# Patient Record
Sex: MALE | Race: WHITE
[De-identification: names, ages, dates, MRNs, and addresses within clinical notes are randomized per-mention and may not be internally consistent; named-entity substitution may affect disease eponyms.]

---

## 2019-06-10 ENCOUNTER — HOSPITAL ENCOUNTER (EMERGENCY)
Dept: HOSPITAL 25 - ED | Age: 39
LOS: 1 days | Discharge: HOME | End: 2019-06-11
Payer: COMMERCIAL

## 2019-06-10 DIAGNOSIS — Z88.1: ICD-10-CM

## 2019-06-10 DIAGNOSIS — F17.200: ICD-10-CM

## 2019-06-10 DIAGNOSIS — K04.7: Primary | ICD-10-CM

## 2019-06-10 PROCEDURE — 96372 THER/PROPH/DIAG INJ SC/IM: CPT

## 2019-06-10 PROCEDURE — 99282 EMERGENCY DEPT VISIT SF MDM: CPT

## 2019-06-11 VITALS — DIASTOLIC BLOOD PRESSURE: 118 MMHG | SYSTOLIC BLOOD PRESSURE: 164 MMHG

## 2019-06-11 NOTE — ED
Throat Pain/Nasal Congestion





- HPI Summary


HPI Summary: 


39 year old male presents with dental pain since yesterday.  he states that 

pain is in his right upper jaw and radiates to his ear.  Denies any chest pain 

or shortness breath.  No pain or swelling around eye.  No nausea and no 

vomiting.  He states the cold makes the pain better.  Has a history of bad 

teeth.  





- History of Current Complaint


Chief Complaint: EDDentalPain


Time Seen by Provider: 06/11/19 01:21





- Allergies/Home Medications


Allergies/Adverse Reactions: 


 Allergies











Allergy/AdvReac Type Severity Reaction Status Date / Time


 


MS Levothyroxine Allergy Intermediate Swelling Verified 06/11/19 01:23





[Levothyroxine]   Of  





   Face,Lips,&  





   Throat  














PMH/Surg Hx/FS Hx/Imm Hx


Endocrine/Hematology History: 


   Denies: Hx Anticoagulant Therapy


Respiratory History: 


   Denies: Hx Asthma





- Immunization History


Date of Tetanus Vaccine: PT STATES UNSURE


Date of Influenza Vaccine: NONE


Infectious Disease History: No


Infectious Disease History: 


   Denies: Traveled Outside the [place] in Last 30 Days





- Family History


Known Family History: Positive: Non-Contributory





- Social History


Alcohol Use: Occasionally


Substance Use Type: Reports: None


Smoking Status (MU): Heavy Every Day Tobacco Smoker





Review of Systems


Negative: Fever


Positive: Dental Pain


Negative: Chest Pain


Negative: Shortness Of Breath


All Other Systems Reviewed And Are Negative: Yes





Physical Exam


Triage Information Reviewed: Yes


Vital Signs On Initial Exam: 


 Initial Vitals











Temp Pulse Resp BP Pulse Ox


 


 98.2 F   75   16   164/118   98 


 


 06/10/19 23:56  06/10/19 23:56  06/10/19 23:56  06/10/19 23:56  06/10/19 23:56











Vital Signs Reviewed: Yes


Appearance: Positive: Well-Appearing


Skin: Positive: Warm, Dry


Head/Face: Positive: Normal Head/Face Inspection


Eyes: Positive: Normal, EOMI, DIOGENES, Conjunctiva Clear


ENT: Positive: Pharynx normal, TMs normal


Dental: Positive: Gross Decay/Caries @ - throughout, Other - erythema to right 

upper jaw


Respiratory/Lung Sounds: Positive: Clear to Auscultation, Breath Sounds Present


Cardiovascular: Positive: Normal, RRR


Musculoskeletal: Positive: Normal


Neurological: Positive: Normal


Psychiatric: Positive: Normal





Diagnostics





- Vital Signs


 Vital Signs











  Temp Pulse Resp BP Pulse Ox


 


 06/10/19 23:56  98.2 F  75  16  164/118  98














- Laboratory


Lab Statement: Any lab studies that have been ordered have been reviewed, and 

results considered in the medical decision making process.





EENT Course/Dx





- Course


Course Of Treatment: 39 year old male presents with dental pain since 

yesterday.  he states that pain is in his right upper jaw and radiates to his 

ear.  Denies any chest pain or shortness breath.  No pain or swelling around 

eye.  No nausea and no vomiting.  He states the cold makes the pain better.  

Has a history of bad teeth.  On exam no swelling noted.  TM normal.  Has 

erythema noted to right upper gumline.  We will treat with penicillin.  gave 

dose of toradol for pain.  Told to follow up with dentist.  Patient understands 

and agrees plan.





- Differential Diagnoses


Differential Diagnoses: Dental Abscess, Dental Caries, Fractured Tooth





- Diagnoses


Provider Diagnoses: 


 Dental infection








Discharge





- Sign-Out/Discharge


Documenting (check all that apply): Patient Departure


Patient Received Moderate/Deep Sedation with Procedure: No





- Discharge Plan


Condition: Good


Disposition: HOME


Prescriptions: 


Penicillin VK TAB* [Penicillin  mg Tab*] 500 mg PO QID #27 tab


Patient Education Materials:  Toothache (ED)


Referrals: 


No Primary Care Phys,NOPCP [Primary Care Provider] - 


Additional Instructions: 


Take antibiotics: 4 times a day for 7 days, first dose given in ED


Use ibuprofen or tyenlol every 6 hours


Return to ED if develop fever, shortness of breath, pain with eye movement or 

swelling around eye


Establish care with primary care physician 


follow up with dentist as soon as possible





- Billing Disposition and Condition


Condition: GOOD


Disposition: Home

## 2019-10-02 ENCOUNTER — HOSPITAL ENCOUNTER (EMERGENCY)
Dept: HOSPITAL 25 - ED | Age: 39
Discharge: HOME | End: 2019-10-02
Payer: COMMERCIAL

## 2019-10-02 VITALS — DIASTOLIC BLOOD PRESSURE: 79 MMHG | SYSTOLIC BLOOD PRESSURE: 127 MMHG

## 2019-10-02 DIAGNOSIS — Z88.8: ICD-10-CM

## 2019-10-02 DIAGNOSIS — F17.200: ICD-10-CM

## 2019-10-02 DIAGNOSIS — F14.10: Primary | ICD-10-CM

## 2019-10-02 LAB
ALBUMIN SERPL BCG-MCNC: 4.2 G/DL (ref 3.2–5.2)
ALBUMIN/GLOB SERPL: 1.8 {RATIO} (ref 1–3)
ALP SERPL-CCNC: 48 U/L (ref 34–104)
ALT SERPL W P-5'-P-CCNC: 14 U/L (ref 7–52)
ANION GAP SERPL CALC-SCNC: 4 MMOL/L (ref 2–11)
AST SERPL-CCNC: 14 U/L (ref 13–39)
BASOPHILS # BLD AUTO: 0 10^3/UL (ref 0–0.2)
BUN SERPL-MCNC: 17 MG/DL (ref 6–24)
BUN/CREAT SERPL: 19.1 (ref 8–20)
CALCIUM SERPL-MCNC: 8.9 MG/DL (ref 8.6–10.3)
CHLORIDE SERPL-SCNC: 108 MMOL/L (ref 101–111)
EOSINOPHIL # BLD AUTO: 0 10^3/UL (ref 0–0.6)
GLOBULIN SER CALC-MCNC: 2.4 G/DL (ref 2–4)
GLUCOSE SERPL-MCNC: 109 MG/DL (ref 70–100)
HCO3 SERPL-SCNC: 25 MMOL/L (ref 22–32)
HCT VFR BLD AUTO: 41 % (ref 42–52)
HGB BLD-MCNC: 14.3 G/DL (ref 14–18)
LYMPHOCYTES # BLD AUTO: 0.8 10^3/UL (ref 1–4.8)
MCH RBC QN AUTO: 32 PG (ref 27–31)
MCHC RBC AUTO-ENTMCNC: 35 G/DL (ref 31–36)
MCV RBC AUTO: 93 FL (ref 80–94)
MONOCYTES # BLD AUTO: 0.7 10^3/UL (ref 0–0.8)
NEUTROPHILS # BLD AUTO: 11.2 10^3/UL (ref 1.5–7.7)
NRBC # BLD AUTO: 0 10^3/UL
NRBC BLD QL AUTO: 0
PLATELET # BLD AUTO: 214 10^3/UL (ref 150–450)
POTASSIUM SERPL-SCNC: 3.9 MMOL/L (ref 3.5–5)
PROT SERPL-MCNC: 6.6 G/DL (ref 6.4–8.9)
RBC # BLD AUTO: 4.42 10^6 /UL (ref 4.18–5.48)
SODIUM SERPL-SCNC: 137 MMOL/L (ref 135–145)
TROPONIN I SERPL-MCNC: 0.01 NG/ML (ref ?–0.04)
WBC # BLD AUTO: 12.8 10^3/UL (ref 3.5–10.8)

## 2019-10-02 PROCEDURE — 86140 C-REACTIVE PROTEIN: CPT

## 2019-10-02 PROCEDURE — 80053 COMPREHEN METABOLIC PANEL: CPT

## 2019-10-02 PROCEDURE — 36415 COLL VENOUS BLD VENIPUNCTURE: CPT

## 2019-10-02 PROCEDURE — 85025 COMPLETE CBC W/AUTO DIFF WBC: CPT

## 2019-10-02 PROCEDURE — 99283 EMERGENCY DEPT VISIT LOW MDM: CPT

## 2019-10-02 PROCEDURE — 84484 ASSAY OF TROPONIN QUANT: CPT

## 2019-10-02 PROCEDURE — 93005 ELECTROCARDIOGRAM TRACING: CPT

## 2019-10-02 NOTE — XMS REPORT
[facility]

 Created on:2019



Patient:Antolin Hart

Sex:Male

:1980

External Reference #:[id number]





Demographics







 Address  [address] 



   [place] [address]

 

 Phone  [phone]

 

 Email Address  [email or web address]

 

 Preferred Language  English

 

 Marital Status  Not  or 

 

 Baptism Affiliation  Unknown

 

 Race  White

 

 Ethnic Group  Not  or 









Author







 Organization  [facility]









Support







 Name  Relationship  Address  Phone

 

 Antolin Hart  Unavailable  PO   [phone]



     [place] [address]  

 

 Angel Hart  Unavailable  Unavailable  [phone]









Care Team Providers







 Name  Role  Phone

 

 Gil Mane  Unavailable  Unavailable









PROBLEMS

Unknown Problems



ALLERGIES

No Information



ENCOUNTERS







 Encounter  Location  Date  Diagnosis

 

 [place] [facility]  [address]  [place],    



   [place] [address]    

 

 [facility]  [address]  [place],    



   [place] [address]    

 

 [facility]  [address]  [place],  29 Aug, 2019  



   [place] [address]    

 

 [facility]  [address]  [place],  20 Aug, 2019  



   [place] [address]    

 

 [facility]  [address]  [place],  06 May, 2019  



   [place] [address]    

 

 [facility]  [address]  [place],  06 May, 2019  



   [place] [address]    

 

 [facility]  [address]  [place],    Flu-like 
symptoms R68.89



   [place] [address]    and Tick bite, initial



       encounter W57.XXXA

 

 [place] [facility]  [address]  [place],  02 Aug, 2016  



   [place] [address]    

 

 [facility]  [address]  [place],    Dental abscess 
[address]



   [place] [address]    

 

 [place] [facility]  [address]  [place],    



   [place] [address]    







IMMUNIZATIONS

No Known Immunizations



SOCIAL HISTORY

Never Assessed



REASON FOR REFERRAL





FUNCTIONAL STATUS





PLAN OF CARE





VITAL SIGNS







 Height  66.6 in  2019

 

 Blood pressure systolic  139 mm Hg  2019

 

 Blood pressure diastolic  89 mm Hg  2019







MEDICATIONS







 Medication  Instructions  Dosage  Frequency  Start Date  End Date  Duration  
Status

 

 Tamiflu 75 MG  Orally Twice a  1 capsule  12h  25 Apr,    5 day(s)  Active



   day      2019      







PROCEDURES







 Procedure  Date Ordered  Result  Body Site

 

 BLOOD PRESSURE, MEASURED  Aug 29, 2019    

 

 EXTRAC ERUPTED TOOTH/EXPOSED ROOT  Aug 29, 2019    







RESULTS

No Results



REASON FOR VISIT





Insurance Providers







 [facility]  Health  Member  Patient  Patient  Patient  
Patient  Patient  Subscriber  Subscriber  Subscriber  Group



 Insurance  Plan  Plan  Plan  Plan  ID  Relationship  Address  Phone  Name  
Date of  ID  Name  Date of  No



 Type  Insurance  Insurance  Insurance  Coverage    to Subscriber        Birth 
     Birth  



   Address  Phone  Name  Dates                    

 

 Medicaid  [address]  [phone]  Medicaid      self      [place]  [id number]  
[id number]      



 [facility]  [address]            [place]          



   [address]                          

 

 [place]  [phone]  [place]      self      [place]  [id number]  
[phone]      



 Medicaid  [address]  [place] [place] [address]    [place]                      

 

 [place]  [address]  [phone]  [place]      self      [place]  [id number]  
[id number]      



 Medicaid [place]  [address]  Med[address] [facility] [place] [address]    Medical                      







MEDICAL (GENERAL) HISTORY







 Type  Description  Date

 

 Medical History  back pain  

 

 Medical History  thyroid disease
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
## 2019-10-02 NOTE — XMS REPORT
[facility]

 Created on:2019



Patient:Antolin Hart

Sex:Male

:1980

External Reference #:[id number]





Demographics







 Address  [address] 



   [place] [address]

 

 Phone  [phone]

 

 Email Address  [email or web address]

 

 Preferred Language  English

 

 Marital Status  Not  or 

 

 Mormonism Affiliation  Unknown

 

 Race  White

 

 Ethnic Group  Not  or 









Author







 Organization  [facility]









Support







 Name  Relationship  Address  Phone

 

 Antolin Hart  Unavailable  PO   [phone]



     [place] [address]  

 

 Angel Hart  Unavailable  Unavailable  [phone]









Care Team Providers







 Name  Role  Phone

 

 Gil Mane  Unavailable  Unavailable









PROBLEMS

Unknown Problems



ALLERGIES

No Information



ENCOUNTERS







 Encounter  Location  Date  Diagnosis

 

 [facility]  [address] [place]  [place],  29 Aug, 2019  



   [place] [address]    

 

 [facility]  [address] [place]  [place],  20 Aug, 2019  



   [place] [address]    

 

 [facility]  [address] [place]  [place],  06 May, 2019  



   [place] [address]    

 

 [facility]  [address] [place]  [place],  06 May, 2019  



   [place] [address]    

 

 [facility]  [address] [place]  [place],    Flu-like 
symptoms R68.89



   [place] [address]    and Tick bite, initial



       encounter W57.XXXA

 

 [facility]  [address] [place]  [place],  02 Aug, 2016  



   [place] [address]    

 

 [facility]  [address] [place]  [place],    Dental abscess 
[address]



   [place] [address]    

 

 [facility]  [address] [place]  [place],    



   [place] [address]    







IMMUNIZATIONS

No Known Immunizations



SOCIAL HISTORY

Never Assessed



REASON FOR REFERRAL





FUNCTIONAL STATUS





PLAN OF CARE





VITAL SIGNS





MEDICATIONS







 Medication  Instructions  Dosage  Frequency  Start Date  End Date  Duration  
Status

 

 Tamiflu 75 MG  Orally Twice a  1 capsule  12h  25 Apr,    5 day(s)  Active



   day      2019      







PROCEDURES







 Procedure  Date Ordered  Result  Body Site

 

 INTRAORL-PERIAPICAL 1 FILM 48817  Aug 20, 2019    

 

 LTD ORAL EVALUATION-PROBLEM FOCUS  Aug 20, 2019    







RESULTS

No Results



REASON FOR VISIT

walk in



Insurance Providers







 [facility]  Health  Member  Patient  Patient  Patient  
Patient  Patient  Subscriber  Subscriber  Subscriber  Group



 Insurance  Plan  Plan  Plan  Plan  ID  Relationship  Address  Phone  Name  
Date of  ID  Name  Date of  No



 Type  Insurance  Insurance  Insurance  Coverage    to Subscriber        Birth 
     Birth  



   Address  Phone  Name  Dates                    

 

 [place]  [phone]  [place]      self      [place]  [id number]  
[id number]      



 Medicaid  [address]  Medicaid            [facility]    [place]                      



 [facility] [address]    [employer]  [address]  [phone]  Medicaid      self      [place]  [id number]  
[id number]      



 [place]  [facility]  [address]            [place]          



   [id number]                          

 

 [place]  [address]  [phone]  [place]      self      [place]  [id number]  
[id number]      



 Medicaid   [place]  [address]  [place] [facility] [place] [address]    Medical                      







MEDICAL (GENERAL) HISTORY







 Type  Description  Date

 

 Medical History  back pain  

 

 Medical History  thyroid disease

## 2019-10-19 ENCOUNTER — HOSPITAL ENCOUNTER (EMERGENCY)
Dept: HOSPITAL 25 - ED | Age: 39
Discharge: HOME | End: 2019-10-19
Payer: COMMERCIAL

## 2019-10-19 VITALS — DIASTOLIC BLOOD PRESSURE: 82 MMHG | SYSTOLIC BLOOD PRESSURE: 130 MMHG

## 2019-10-19 DIAGNOSIS — R42: ICD-10-CM

## 2019-10-19 DIAGNOSIS — F17.200: ICD-10-CM

## 2019-10-19 DIAGNOSIS — T63.441A: Primary | ICD-10-CM

## 2019-10-19 DIAGNOSIS — J02.9: ICD-10-CM
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
## 2019-10-19 DIAGNOSIS — Z88.8: ICD-10-CM

## 2019-10-19 DIAGNOSIS — R11.0: ICD-10-CM

## 2019-10-19 LAB
ALBUMIN SERPL BCG-MCNC: 4.8 G/DL (ref 3.2–5.2)
ALBUMIN/GLOB SERPL: 1.7 {RATIO} (ref 1–3)
ALP SERPL-CCNC: 54 U/L (ref 34–104)
ALT SERPL W P-5'-P-CCNC: 16 U/L (ref 7–52)
ANION GAP SERPL CALC-SCNC: 18 MMOL/L (ref 2–11)
AST SERPL-CCNC: 21 U/L (ref 13–39)
BASOPHILS # BLD AUTO: 0.1 10^3/UL (ref 0–0.2)
BUN SERPL-MCNC: 14 MG/DL (ref 6–24)
BUN/CREAT SERPL: 10.5 (ref 8–20)
CALCIUM SERPL-MCNC: 9.6 MG/DL (ref 8.6–10.3)
CHLORIDE SERPL-SCNC: 105 MMOL/L (ref 101–111)
EOSINOPHIL # BLD AUTO: 0 10^3/UL (ref 0–0.6)
GLOBULIN SER CALC-MCNC: 2.9 G/DL (ref 2–4)
GLUCOSE SERPL-MCNC: 85 MG/DL (ref 70–100)
HCO3 SERPL-SCNC: 16 MMOL/L (ref 22–32)
HCT VFR BLD AUTO: 43 % (ref 42–52)
HGB BLD-MCNC: 14.6 G/DL (ref 14–18)
LYMPHOCYTES # BLD AUTO: 0.9 10^3/UL (ref 1–4.8)
MCH RBC QN AUTO: 32 PG (ref 27–31)
MCHC RBC AUTO-ENTMCNC: 34 G/DL (ref 31–36)
MCV RBC AUTO: 95 FL (ref 80–94)
MONOCYTES # BLD AUTO: 1 10^3/UL (ref 0–0.8)
NEUTROPHILS # BLD AUTO: 12.3 10^3/UL (ref 1.5–7.7)
NRBC # BLD AUTO: 0 10^3/UL
NRBC BLD QL AUTO: 0.1
PLATELET # BLD AUTO: 230 10^3/UL (ref 150–450)
POTASSIUM SERPL-SCNC: 3.6 MMOL/L (ref 3.5–5)
PROT SERPL-MCNC: 7.7 G/DL (ref 6.4–8.9)
RBC # BLD AUTO: 4.54 10^6 /UL (ref 4.18–5.48)
SODIUM SERPL-SCNC: 139 MMOL/L (ref 135–145)
TROPONIN I SERPL-MCNC: 0.02 NG/ML (ref ?–0.04)
WBC # BLD AUTO: 14.3 10^3/UL (ref 3.5–10.8)

## 2019-10-19 PROCEDURE — 80053 COMPREHEN METABOLIC PANEL: CPT

## 2019-10-19 PROCEDURE — 99282 EMERGENCY DEPT VISIT SF MDM: CPT

## 2019-10-19 PROCEDURE — 84484 ASSAY OF TROPONIN QUANT: CPT

## 2019-10-19 PROCEDURE — 85025 COMPLETE CBC W/AUTO DIFF WBC: CPT

## 2019-10-19 PROCEDURE — 36415 COLL VENOUS BLD VENIPUNCTURE: CPT

## 2019-10-19 PROCEDURE — 93005 ELECTROCARDIOGRAM TRACING: CPT

## 2019-10-19 PROCEDURE — 86140 C-REACTIVE PROTEIN: CPT

## 2019-10-19 NOTE — ED
Complex/Multi-Sys Presentation





- HPI Summary


HPI Summary: 


39-year-old male presents with allergic reaction.  He states he got stung in 

his left arm about an hour ago.  He states that since then he's had a sore 

throat and dizziness.  He states he feels a little dehydrated.  Denies any 

chest pressure or shortness of breath.  Admits to nausea but no vomiting or 

abdominal pain.  He hasn't taking anything for symptoms.  Never had this 

reaction before.  He did drink some alcohol today.  Denies any drug use.  No 

rash.  





- History Of Current Complaint


Chief Complaint: EDAllergicReaction


Time Seen by Provider: 10/19/19 21:46





- Allergies/Home Medications


Allergies/Adverse Reactions: 


 Allergies











Allergy/AdvReac Type Severity Reaction Status Date / Time


 


levothyroxine Allergy  swelling Verified 10/19/19 22:13





   of face,  





   lips, and  





   throat  














PMH/Surg Hx/FS Hx/Imm Hx


Endocrine/Hematology History: 


   Denies: Hx Anticoagulant Therapy


Respiratory History: 


   Denies: Hx Asthma


 History: 


   Denies: Hx Dialysis


Sensory History: 


   Denies: Hx Eye Prosthesis, Hx Legally Blind, Hx Deafness


Opthamlomology History: 


   Denies: Hx Eye Prosthesis, Hx Legally Blind


Neurological History: 


   Denies: Hx Dementia


Psychiatric History: 


   Denies: Hx Autism





- Immunization History


Date of Tetanus Vaccine: PT STATES UNSURE


Date of Influenza Vaccine: NONE


Infectious Disease History: No


Infectious Disease History: 


   Denies: Traveled Outside the [place] in Last 30 Days





- Family History


Known Family History: Positive: Non-Contributory





- Social History


Alcohol Use: Rare


Substance Use Type: Reports: Cocaine, Excessive Caffeine


Smoking Status (MU): Heavy Every Day Tobacco Smoker





Review of Systems


Negative: Fever


Positive: Sore Throat


Negative: Chest Pain


Negative: Shortness Of Breath


Negative: Rash


All Other Systems Reviewed And Are Negative: Yes





Physical Exam


Triage Information Reviewed: Yes


Vital Signs On Initial Exam: 


 Initial Vitals











Temp Pulse Resp BP Pulse Ox


 


 98.8 F   115   20   146/92   96 


 


 10/19/19 21:29  10/19/19 21:29  10/19/19 21:29  10/19/19 21:29  10/19/19 21:29











Vital Signs Reviewed: Yes


Appearance: Positive: Well-Appearing


Skin: Positive: Warm, Dry


Head/Face: Positive: Normal Head/Face Inspection


Eyes: Positive: Normal, EOMI, DIOGENES, Conjunctiva Clear


ENT: Positive: Normal ENT inspection, Pharynx normal, TMs normal


Respiratory/Lung Sounds: Positive: Clear to Auscultation, Breath Sounds Present


Cardiovascular: Positive: Normal, RRR


Abdomen Description: Positive: Nontender, Soft


Bowel Sounds: Positive: Present


Musculoskeletal: Positive: Normal


Neurological: Positive: Normal


Psychiatric: Positive: Normal





Procedures





- Sedation


Patient Received Moderate/Deep Sedation with Procedure: No





Diagnostics





- Vital Signs


 Vital Signs











  Temp Pulse Resp BP Pulse Ox


 


 10/19/19 22:11   95   122/78  95


 


 10/19/19 21:29  98.8 F  115  20  146/92  96














- Laboratory


Lab Results: 


 Lab Results











  10/19/19 Range/Units





  22:08 


 


WBC  14.3 H  (3.5-10.8)  10^3/uL


 


RBC  4.54  (4.18-5.48)  10^6 /uL


 


Hgb  14.6  (14.0-18.0)  g/dL


 


Hct  43  (42-52)  %


 


MCV  95 H  (80-94)  fL


 


MCH  32 H  (27-31)  pg


 


MCHC  34  (31-36)  g/dL


 


RDW  14  (10-15)  %


 


Plt Count  230  (150-450)  10^3/uL


 


MPV  6.8 L  (7.4-10.4)  fL


 


Neut % (Auto)  85.7  %


 


Lymph % (Auto)  6.6  %


 


Mono % (Auto)  7.2  %


 


Eos % (Auto)  0.1  %


 


Baso % (Auto)  0.4  %


 


Absolute Neuts (auto)  12.3 H  (1.5-7.7)  10^3/ul


 


Absolute Lymphs (auto)  0.9 L  (1.0-4.8)  10^3/ul


 


Absolute Monos (auto)  1.0 H  (0-0.8)  10^3/ul


 


Absolute Eos (auto)  0.0  (0-0.6)  10^3/ul


 


Absolute Basos (auto)  0.1  (0-0.2)  10^3/ul


 


Absolute Nucleated RBC  0.0  10^3/ul


 


Nucleated RBC %  0.1  











Result Diagrams: 


 10/19/19 22:08





 10/19/19 22:08


Lab Statement: Any lab studies that have been ordered have been reviewed, and 

results considered in the medical decision making process.





- EKG


  ** No standard instances


Cardiac Rate: NL


EKG Rhythm: Sinus Rhythm


EKG Comparison: No Significant Change


Summary of EKG Findings: sinus rhythm





Re-Evaluation





- Re-Evaluation


  ** First Eval


Re-Evaluation Time: 22:44


Change: Improved


Comment: feeling better





Complex Multi-Symp Course/Dx


Course Of Treatment: 39-year-old male presents with allergic reaction.  He 

states he got stung in his left arm about an hour ago.  He states that since 

then he's had a sore throat and dizziness.  He states he feels a little 

dehydrated.  Denies any chest pressure or shortness of breath.  Admits to 

nausea but no vomiting or abdominal pain.  He hasn't taking anything for 

symptoms.  Never had this reaction before.  He did drink some alcohol today.  

Denies any drug use.  No rash.  On exam no rash.  lungs CTA.  Pharynx normal. 

wbc elevated. crp normal. troponin .02. ekg sinus rhythm.  Gave fluids and 

Benadryl patient is feeling better.  We'll discharge to have continued 

Benadryl.  Patient understands agrees the plan.





- Diagnoses


Differential Diagnoses/HQI/PQRI: Metabolic Abnormality, Other - allergic 

reaction, anaphylaxis


Provider Diagnoses: 


 Bee sting








Discharge ED





- Sign-Out/Discharge


Documenting (check all that apply): Patient Departure





- Discharge Plan


Condition: Good


Disposition: HOME


Patient Education Materials:  Insect Bite or Sting (ED)


Referrals: 


No Primary Care Phys,NOPCP [Primary Care Provider] - 


Additional Instructions: 


Take Benadryl every 6 hours


follow up with primary


Return to ED if develop any new or worsening symptoms





- Billing Disposition and Condition


Condition: GOOD


Disposition: Home

## 2019-11-05 ENCOUNTER — HOSPITAL ENCOUNTER (EMERGENCY)
Dept: HOSPITAL 25 - ED | Age: 39
Discharge: HOME | End: 2019-11-05
Payer: COMMERCIAL

## 2019-11-05 VITALS — SYSTOLIC BLOOD PRESSURE: 110 MMHG | DIASTOLIC BLOOD PRESSURE: 62 MMHG

## 2019-11-05 DIAGNOSIS — M54.2: ICD-10-CM

## 2019-11-05 DIAGNOSIS — Z88.8: ICD-10-CM

## 2019-11-05 DIAGNOSIS — S61.411A: ICD-10-CM

## 2019-11-05 DIAGNOSIS — F10.129: ICD-10-CM

## 2019-11-05 DIAGNOSIS — F17.200: ICD-10-CM

## 2019-11-05 DIAGNOSIS — S61.412A: ICD-10-CM
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
## 2019-11-05 DIAGNOSIS — S42.031A: Primary | ICD-10-CM
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
## 2019-11-05 LAB
ALBUMIN SERPL BCG-MCNC: 4.7 G/DL (ref 3.2–5.2)
ALBUMIN/GLOB SERPL: 1.7 {RATIO} (ref 1–3)
ALP SERPL-CCNC: 73 U/L (ref 34–104)
ALT SERPL W P-5'-P-CCNC: 22 U/L (ref 7–52)
ANION GAP SERPL CALC-SCNC: 6 MMOL/L (ref 2–11)
AST SERPL-CCNC: 20 U/L (ref 13–39)
BASOPHILS # BLD AUTO: 0.1 10^3/UL (ref 0–0.2)
BUN SERPL-MCNC: 14 MG/DL (ref 6–24)
BUN/CREAT SERPL: 17.3 (ref 8–20)
CALCIUM SERPL-MCNC: 9.3 MG/DL (ref 8.6–10.3)
CHLORIDE SERPL-SCNC: 103 MMOL/L (ref 101–111)
EOSINOPHIL # BLD AUTO: 0.1 10^3/UL (ref 0–0.6)
GLOBULIN SER CALC-MCNC: 2.7 G/DL (ref 2–4)
GLUCOSE SERPL-MCNC: 107 MG/DL (ref 70–100)
HCO3 SERPL-SCNC: 28 MMOL/L (ref 22–32)
HCT VFR BLD AUTO: 43 % (ref 42–52)
HGB BLD-MCNC: 14.8 G/DL (ref 14–18)
INR PPP/BLD: 1.03 (ref 0.82–1.09)
LYMPHOCYTES # BLD AUTO: 1.3 10^3/UL (ref 1–4.8)
MCH RBC QN AUTO: 32 PG (ref 27–31)
MCHC RBC AUTO-ENTMCNC: 35 G/DL (ref 31–36)
MCV RBC AUTO: 93 FL (ref 80–94)
MONOCYTES # BLD AUTO: 0.9 10^3/UL (ref 0–0.8)
NEUTROPHILS # BLD AUTO: 13.3 10^3/UL (ref 1.5–7.7)
NRBC # BLD AUTO: 0 10^3/UL
NRBC BLD QL AUTO: 0
PLATELET # BLD AUTO: 288 10^3/UL (ref 150–450)
POTASSIUM SERPL-SCNC: 3.9 MMOL/L (ref 3.5–5)
PROT SERPL-MCNC: 7.4 G/DL (ref 6.4–8.9)
RBC # BLD AUTO: 4.57 10^6 /UL (ref 4.18–5.48)
SODIUM SERPL-SCNC: 137 MMOL/L (ref 135–145)
TROPONIN I SERPL-MCNC: 0 NG/ML (ref ?–0.04)
WBC # BLD AUTO: 15.6 10^3/UL (ref 3.5–10.8)

## 2019-11-05 PROCEDURE — G0480 DRUG TEST DEF 1-7 CLASSES: HCPCS

## 2019-11-05 PROCEDURE — 81003 URINALYSIS AUTO W/O SCOPE: CPT

## 2019-11-05 PROCEDURE — 80053 COMPREHEN METABOLIC PANEL: CPT

## 2019-11-05 PROCEDURE — 83605 ASSAY OF LACTIC ACID: CPT

## 2019-11-05 PROCEDURE — 96374 THER/PROPH/DIAG INJ IV PUSH: CPT

## 2019-11-05 PROCEDURE — 85610 PROTHROMBIN TIME: CPT

## 2019-11-05 PROCEDURE — 96361 HYDRATE IV INFUSION ADD-ON: CPT

## 2019-11-05 PROCEDURE — 70450 CT HEAD/BRAIN W/O DYE: CPT

## 2019-11-05 PROCEDURE — 99284 EMERGENCY DEPT VISIT MOD MDM: CPT

## 2019-11-05 PROCEDURE — 80320 DRUG SCREEN QUANTALCOHOLS: CPT

## 2019-11-05 PROCEDURE — 85025 COMPLETE CBC W/AUTO DIFF WBC: CPT

## 2019-11-05 PROCEDURE — 36415 COLL VENOUS BLD VENIPUNCTURE: CPT

## 2019-11-05 PROCEDURE — 93005 ELECTROCARDIOGRAM TRACING: CPT

## 2019-11-05 PROCEDURE — 80307 DRUG TEST PRSMV CHEM ANLYZR: CPT

## 2019-11-05 PROCEDURE — 84484 ASSAY OF TROPONIN QUANT: CPT

## 2019-11-05 PROCEDURE — 74177 CT ABD & PELVIS W/CONTRAST: CPT

## 2019-11-05 PROCEDURE — 72125 CT NECK SPINE W/O DYE: CPT

## 2019-11-05 PROCEDURE — 71260 CT THORAX DX C+: CPT
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
## 2020-03-25 ENCOUNTER — HOSPITAL ENCOUNTER (OUTPATIENT)
Dept: HOSPITAL 25 - OREAST | Age: 40
Discharge: HOME | End: 2020-03-25
Attending: ORTHOPAEDIC SURGERY
Payer: COMMERCIAL

## 2020-03-25 VITALS — SYSTOLIC BLOOD PRESSURE: 134 MMHG | DIASTOLIC BLOOD PRESSURE: 92 MMHG
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
## 2020-03-25 DIAGNOSIS — Z47.2: Primary | ICD-10-CM

## 2020-03-25 DIAGNOSIS — K21.9: ICD-10-CM

## 2020-03-25 DIAGNOSIS — F43.21: ICD-10-CM

## 2020-03-25 DIAGNOSIS — F17.200: ICD-10-CM

## 2020-03-25 DIAGNOSIS — S42.031D: ICD-10-CM
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
## 2020-03-25 PROCEDURE — 88300 SURGICAL PATH GROSS: CPT
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
## 2020-03-25 NOTE — OP
OPERATIVE REPORT:

 

DATE OF OPERATION:  03/25/20

 

DATE OF BIRTH:  [birth year]

 

SURGEON:  Robert Castellano MD.

 

ASSISTANT:  ANDREW Mathias.

 

A physician assistant was required for the length of the procedure for patient positioning, retractio
n, and closure.  Not all foreign body removals require an assistant.  This case does as the plate is 
placed deeper, and the clavicle just as open reduction internal fixation surgery requires an assistan
t, so too does removal of this plate require an assistant.

 

ANESTHESIOLOGIST:  Dr. Kaelyn Vieira.

 

ANESTHESIA:  General anesthesia, local anesthesia Marcaine 10 cc.

 

PRE-OP DIAGNOSES:

1.  Status post 11/20/19 open reduction internal fixation right lateral clavicle fracture with clavic
le hook plate.

2.  Retained hardware, right clavicle hook plate.

 

POST-OP DIAGNOSES:

1.  Status post 11/20/19 open reduction internal fixation right lateral clavicle fracture with clavic
le hook plate.

2.  Retained hardware, right clavicle hook plate.

 

OPERATIVE PROCEDURE:  Removal of foreign body, right lateral clavicle hook plate.

 

ANTIBIOTICS:  Ancef 2 g IV.

 

IV FLUIDS:  See Anesthesia note.

 

SKIN-TO-SKIN TIME:  38 minutes.

 

SPECIMEN:  Right Synthes clavicle hook plate with 5 screws removed.

 

COMPLICATIONS:  None.

 

ESTIMATED BLOOD LOSS:  Minimal.

 

INDICATIONS FOR PROCEDURE:  The patient is a 40-year-old man, right-hand dominant, , who under
went surgery on 11/20/19, consisting an open reduction internal fixation of right lateral clavicle fr
acture with clavicle hook plate.  Surgery was indicated due to the displacement at the lateral clavic
le fracture site.  Plate was placed with the understanding that it would need to be removed 3 to 4 mo
nths postoperative, which is the 's suggestion for this plate and the orthopedic standard
 for clavicle hook plates.  These plates need to be removed to avoid erosion of the acromion or injur
y to the rotator cuff.

 

The patient had an uneventful postoperative course.  He is just over 4 months and we signed him up fo
r removal of hardware.

 

Discussed risks and potential complications of surgery.  These included nonunion and need to replace 
the plate or need to, in the future, place a plate.  The patient still had some tenderness to palpati
on at the fracture site as well as some lucency on preoperative imaging.  He is a cigarette smoker, w
hich is certainly a risk factor for delayed union or nonunion.  However, given that we were over 4 mo
nths postoperative after the first index procedure, I thought it was judicious to move forward with r
emoval of hardware.

 

DESCRIPTION OF PROCEDURE:  In preoperative holding, the patient signed a written consent.  Operative 
extremity was marked in preoperative holding.  The patient was taken back to the operating room, [facility]
ed supine on the operating room table. Sedated and intubated.  Converted into the beach-chair positio
n.  Right shoulder was prepped and draped.  Surgical time-out formally performed.

 

I made a skin incision through much of the prior surgical incision scar.  I dissected down to plate. 
 Debrided soft tissue off of plate with rongeur, curette, periosteal elevator.  Identified the screws
.  I removed the screws.

 

Tested the fracture site.  No gross movement at fracture site with vigorous manipulation.  Removed so
me fibrous tissue off the superior aspect of the clavicle with a rongeur.  Debrided screw tunnels in 
the clavicle with the small curette available.  Upon opening dissection, I had cauterized some minima
l bleeding with electrocautery and likewise that the step did so.

 

Irrigation.

 

Closure.  Closure of the deltopectoral trapezial fascia with figure-of-eight stitches using Vicryl 0 
suture.  Closure of more superficial tissue with figure-of- eight stitches using Vicryl 2-0 suture.  
Closure of the subcutaneous tissue with buried simple stitches using Vicryl 2-0 and Vicryl 3-0 suture
.  Closure of the subcuticular layer with a running stitch using Monocryl 3-0 suture.  Mastisol, Ster
i-Strips.  Local anesthesia injected, 10 cc.  4x4s, Tegaderms.  The patient placed in a sling.  The p
atient was awakened, extubated, and transferred to the PACU.

 

DISPOSITION:  We suggested sling at all times and to follow up in clinic.  The patient will follow up
 in clinic approximately 1 week postoperatively.  Wound care instructions provided.  In clinic, we wi
ll obtain x-rays to confirm no loss of reduction at the fracture site and we will discuss return to f
ull activity timeline.  Pain medication was prescribed to take as required.

 

 [id number]/[facility] #: [id number]